# Patient Record
Sex: FEMALE | Race: WHITE | Employment: OTHER | URBAN - NONMETROPOLITAN AREA
[De-identification: names, ages, dates, MRNs, and addresses within clinical notes are randomized per-mention and may not be internally consistent; named-entity substitution may affect disease eponyms.]

---

## 2018-09-28 DIAGNOSIS — R10.11 RIGHT UPPER QUADRANT PAIN: Primary | ICD-10-CM

## 2018-12-12 ENCOUNTER — HOSPITAL ENCOUNTER (EMERGENCY)
Facility: HOSPITAL | Age: 48
Discharge: HOME | End: 2018-12-12
Attending: PHYSICIAN ASSISTANT | Admitting: PHYSICIAN ASSISTANT
Payer: COMMERCIAL

## 2018-12-12 VITALS
OXYGEN SATURATION: 100 % | DIASTOLIC BLOOD PRESSURE: 98 MMHG | TEMPERATURE: 97.2 F | RESPIRATION RATE: 16 BRPM | SYSTOLIC BLOOD PRESSURE: 139 MMHG | HEART RATE: 87 BPM

## 2018-12-12 DIAGNOSIS — K04.7 DENTAL ABSCESS: ICD-10-CM

## 2018-12-12 PROCEDURE — 25000125 ZZHC RX 250: Performed by: PHYSICIAN ASSISTANT

## 2018-12-12 PROCEDURE — 99202 OFFICE O/P NEW SF 15 MIN: CPT | Performed by: PHYSICIAN ASSISTANT

## 2018-12-12 PROCEDURE — 96372 THER/PROPH/DIAG INJ SC/IM: CPT

## 2018-12-12 PROCEDURE — 25000128 H RX IP 250 OP 636: Performed by: PHYSICIAN ASSISTANT

## 2018-12-12 PROCEDURE — 25000132 ZZH RX MED GY IP 250 OP 250 PS 637: Performed by: PHYSICIAN ASSISTANT

## 2018-12-12 PROCEDURE — G0463 HOSPITAL OUTPT CLINIC VISIT: HCPCS | Mod: 25

## 2018-12-12 RX ORDER — ACETAMINOPHEN 325 MG/1
975 TABLET ORAL ONCE
Status: COMPLETED | OUTPATIENT
Start: 2018-12-12 | End: 2018-12-12

## 2018-12-12 RX ORDER — DEXAMETHASONE SODIUM PHOSPHATE 10 MG/ML
10 INJECTION INTRAMUSCULAR; INTRAVENOUS EVERY 6 HOURS
Status: DISCONTINUED | OUTPATIENT
Start: 2018-12-12 | End: 2018-12-12 | Stop reason: HOSPADM

## 2018-12-12 RX ORDER — PREDNISONE 20 MG/1
TABLET ORAL
Qty: 8 TABLET | Refills: 0 | Status: SHIPPED | OUTPATIENT
Start: 2018-12-12

## 2018-12-12 RX ORDER — CEFTRIAXONE SODIUM 1 G
1 VIAL (EA) INJECTION ONCE
Status: COMPLETED | OUTPATIENT
Start: 2018-12-12 | End: 2018-12-12

## 2018-12-12 RX ADMIN — CEFTRIAXONE SODIUM 1 G: 1 INJECTION, POWDER, FOR SOLUTION INTRAMUSCULAR; INTRAVENOUS at 18:09

## 2018-12-12 RX ADMIN — ACETAMINOPHEN 975 MG: 325 TABLET, FILM COATED ORAL at 18:09

## 2018-12-12 RX ADMIN — DEXAMETHASONE SODIUM PHOSPHATE 10 MG: 10 INJECTION INTRAMUSCULAR; INTRAVENOUS at 18:09

## 2018-12-12 ASSESSMENT — ENCOUNTER SYMPTOMS
FACIAL SWELLING: 0
VOMITING: 0
NAUSEA: 0
NECK PAIN: 0
FATIGUE: 1
NEUROLOGICAL NEGATIVE: 1
CARDIOVASCULAR NEGATIVE: 1
PSYCHIATRIC NEGATIVE: 1
RESPIRATORY NEGATIVE: 1
NECK STIFFNESS: 0

## 2018-12-12 NOTE — ED TRIAGE NOTES
Pt is here alone. She is complaining of a tooth abscess on left side of mouth. She was given a Rx for penicillin and has been on that for 3 days. Continues to have swelling and pain on left side of face. Pain in ear and side of face after squeezing area thinking she could drain the area. Worried that infection moved up into her glands.

## 2018-12-12 NOTE — ED PROVIDER NOTES
History     Chief Complaint   Patient presents with     Dental Pain     The history is provided by the patient.     Sheila Kevin is a 48 year old female who   Who has exacerbation of left upper dental pain. Left cheek pain, no redness/swelling. Pt has appt next week with Maxofacial. No n/v/f. Pt on PCN but pt feels she is getting worse. She pushed on the abscess and she states pain shot up her face.      Past Medical History:    History reviewed. No pertinent past medical history.    Past Surgical History:    History reviewed. No pertinent surgical history.    Family History:    No family history on file.    Social History:  Marital Status:  Unknown [6]  Social History     Tobacco Use     Smoking status: Not on file   Substance Use Topics     Alcohol use: Not on file     Drug use: Not on file        Medications:      amoxicillin-clavulanate (AUGMENTIN) 875-125 MG tablet   predniSONE (DELTASONE) 20 MG tablet         Review of Systems   Constitutional: Positive for fatigue.   HENT: Positive for dental problem. Negative for ear pain and facial swelling.    Respiratory: Negative.    Cardiovascular: Negative.    Gastrointestinal: Negative for nausea and vomiting.   Musculoskeletal: Negative for neck pain and neck stiffness.   Neurological: Negative.    Psychiatric/Behavioral: Negative.        Physical Exam   BP: 139/98  Pulse: 87  Temp: 97.2  F (36.2  C)  Resp: 16  SpO2: 100 %      Physical Exam   Constitutional: She is oriented to person, place, and time. She appears well-developed and well-nourished. No distress.   HENT:   Head: Normocephalic and atraumatic.   Tooth # 16 has surrounding edema/erythema.  Left cheek has moderate TTP, no edema/erythema   Neck: Normal range of motion. Neck supple.   Cardiovascular: Normal rate, regular rhythm and normal heart sounds.   Pulmonary/Chest: Effort normal and breath sounds normal.   Lymphadenopathy:     She has no cervical adenopathy.   Neurological: She is alert and  oriented to person, place, and time.   Skin: She is not diaphoretic.   Psychiatric: She has a normal mood and affect.   Nursing note and vitals reviewed.      ED Course        Procedures                 No results found for this or any previous visit (from the past 24 hour(s)).    Medications   dexamethasone oral soln (DECADRON) (inj used orally,not preservative free) 10 mg (10 mg Oral Given 12/12/18 1809)   cefTRIAXone (ROCEPHIN) injection 1 g (1 g Intramuscular Given 12/12/18 1809)   acetaminophen (TYLENOL) tablet 975 mg (975 mg Oral Given 12/12/18 1809)       Assessments & Plan (with Medical Decision Making)     I have reviewed the nursing notes.    I have reviewed the findings, diagnosis, plan and need for follow up with the patient.      Augmentin 875 mg 1 po BID x 10 days    Final diagnoses:   Dental abscess           Patient given education sheet for each diagnosis.  Patient has no further questions.  Take medication as as directed.  Follow up with dental provider with first available appointment.  Follow up with PCP if symptoms increase prior to your dental appointment  Return to ED if fever/further concerns develop  Reshma Boyle   Physician Assistant-C  12/12/2018  6:14 PM  [facility]  12/12/2018   [facility]     Reshma Boyle PA  12/12/18 [id number]

## 2018-12-12 NOTE — ED TRIAGE NOTES
Pt presents with c/o dental pain, saw the oral surgeon today and the tooth is coming out next week.

## 2018-12-12 NOTE — ED AVS SNAPSHOT
[facility]  [address] [address]  Phone:  [phone]                                    Sheila Kevin   MRN: [id number]    Department:  [facility]   Date of Visit:  12/12/2018           After Visit Summary Signature Page    I have received my discharge instructions, and my questions have been answered. I have discussed any challenges I see with this plan with the nurse or doctor.    ..........................................................................................................................................  Patient/Patient Representative Signature      ..........................................................................................................................................  Patient Representative Print Name and Relationship to Patient    ..................................................               ................................................  Date                                   Time    ..........................................................................................................................................  Reviewed by Signature/Title    ...................................................              ..............................................  Date                                               Time          22EPIC Rev 08/18